# Patient Record
Sex: MALE | Race: WHITE | NOT HISPANIC OR LATINO | Employment: UNEMPLOYED | URBAN - METROPOLITAN AREA
[De-identification: names, ages, dates, MRNs, and addresses within clinical notes are randomized per-mention and may not be internally consistent; named-entity substitution may affect disease eponyms.]

---

## 2024-01-08 ENCOUNTER — HOSPITAL ENCOUNTER (INPATIENT)
Facility: HOSPITAL | Age: 44
LOS: 1 days | Discharge: LEFT BEFORE DISCHARGE | DRG: 770 | End: 2024-01-08
Attending: EMERGENCY MEDICINE | Admitting: FAMILY MEDICINE
Payer: COMMERCIAL

## 2024-01-08 ENCOUNTER — APPOINTMENT (EMERGENCY)
Dept: RADIOLOGY | Facility: HOSPITAL | Age: 44
DRG: 770 | End: 2024-01-08
Payer: COMMERCIAL

## 2024-01-08 VITALS
DIASTOLIC BLOOD PRESSURE: 76 MMHG | RESPIRATION RATE: 18 BRPM | TEMPERATURE: 97.6 F | HEART RATE: 78 BPM | OXYGEN SATURATION: 98 % | SYSTOLIC BLOOD PRESSURE: 133 MMHG

## 2024-01-08 DIAGNOSIS — D64.9 ANEMIA: ICD-10-CM

## 2024-01-08 DIAGNOSIS — K74.60 CIRRHOSIS (HCC): ICD-10-CM

## 2024-01-08 DIAGNOSIS — F10.10 ALCOHOL ABUSE: Primary | ICD-10-CM

## 2024-01-08 PROBLEM — R45.851 SUICIDAL IDEATION: Status: ACTIVE | Noted: 2024-01-08

## 2024-01-08 PROBLEM — F41.9 ANXIETY DISORDER: Status: ACTIVE | Noted: 2024-01-08

## 2024-01-08 PROBLEM — F10.939 ALCOHOL WITHDRAWAL (HCC): Status: ACTIVE | Noted: 2024-01-08

## 2024-01-08 PROBLEM — K70.31 ALCOHOLIC CIRRHOSIS OF LIVER WITH ASCITES (HCC): Status: ACTIVE | Noted: 2024-01-08

## 2024-01-08 PROBLEM — R17 ELEVATED BILIRUBIN: Status: ACTIVE | Noted: 2024-01-08

## 2024-01-08 PROBLEM — E89.0 POSTABLATIVE HYPOTHYROIDISM: Status: ACTIVE | Noted: 2024-01-08

## 2024-01-08 PROBLEM — R10.12 LEFT UPPER QUADRANT ABDOMINAL PAIN: Status: ACTIVE | Noted: 2024-01-08

## 2024-01-08 PROBLEM — Z72.0 TOBACCO ABUSE: Status: ACTIVE | Noted: 2024-01-08

## 2024-01-08 PROBLEM — D69.6 THROMBOCYTOPENIA (HCC): Status: ACTIVE | Noted: 2024-01-08

## 2024-01-08 LAB
2HR DELTA HS TROPONIN: 1 NG/L
ALBUMIN SERPL BCP-MCNC: 3 G/DL (ref 3.5–5)
ALP SERPL-CCNC: 344 U/L (ref 34–104)
ALT SERPL W P-5'-P-CCNC: 73 U/L (ref 7–52)
AMMONIA PLAS-SCNC: 68 UMOL/L (ref 18–72)
AMPHETAMINES SERPL QL SCN: NEGATIVE
ANION GAP SERPL CALCULATED.3IONS-SCNC: 6 MMOL/L
ANISOCYTOSIS BLD QL SMEAR: PRESENT
AST SERPL W P-5'-P-CCNC: 124 U/L (ref 13–39)
ATRIAL RATE: 187 BPM
ATRIAL RATE: 84 BPM
BARBITURATES UR QL: NEGATIVE
BASOPHILS # BLD AUTO: 0.07 THOUSANDS/ÂΜL (ref 0–0.1)
BASOPHILS NFR BLD AUTO: 2 % (ref 0–1)
BENZODIAZ UR QL: NEGATIVE
BILIRUB SERPL-MCNC: 2.48 MG/DL (ref 0.2–1)
BUN SERPL-MCNC: 12 MG/DL (ref 5–25)
CALCIUM ALBUM COR SERPL-MCNC: 8.7 MG/DL (ref 8.3–10.1)
CALCIUM SERPL-MCNC: 7.9 MG/DL (ref 8.4–10.2)
CARDIAC TROPONIN I PNL SERPL HS: 5 NG/L
CARDIAC TROPONIN I PNL SERPL HS: 6 NG/L
CHLORIDE SERPL-SCNC: 113 MMOL/L (ref 96–108)
CO2 SERPL-SCNC: 24 MMOL/L (ref 21–32)
COCAINE UR QL: NEGATIVE
CREAT SERPL-MCNC: 0.67 MG/DL (ref 0.6–1.3)
EOSINOPHIL # BLD AUTO: 0.34 THOUSAND/ÂΜL (ref 0–0.61)
EOSINOPHIL NFR BLD AUTO: 8 % (ref 0–6)
ERYTHROCYTE [DISTWIDTH] IN BLOOD BY AUTOMATED COUNT: 17.2 % (ref 11.6–15.1)
ETHANOL EXG-MCNC: 0.2 MG/DL
GFR SERPL CREATININE-BSD FRML MDRD: 119 ML/MIN/1.73SQ M
GLUCOSE SERPL-MCNC: 109 MG/DL (ref 65–140)
HCT VFR BLD AUTO: 29.9 % (ref 36.5–49.3)
HGB BLD-MCNC: 9.5 G/DL (ref 12–17)
IMM GRANULOCYTES # BLD AUTO: 0.01 THOUSAND/UL (ref 0–0.2)
IMM GRANULOCYTES NFR BLD AUTO: 0 % (ref 0–2)
LIPASE SERPL-CCNC: 143 U/L (ref 11–82)
LYMPHOCYTES # BLD AUTO: 1.81 THOUSANDS/ÂΜL (ref 0.6–4.47)
LYMPHOCYTES NFR BLD AUTO: 40 % (ref 14–44)
MCH RBC QN AUTO: 30.3 PG (ref 26.8–34.3)
MCHC RBC AUTO-ENTMCNC: 31.8 G/DL (ref 31.4–37.4)
MCV RBC AUTO: 95 FL (ref 82–98)
METHADONE UR QL: NEGATIVE
MONOCYTES # BLD AUTO: 0.49 THOUSAND/ÂΜL (ref 0.17–1.22)
MONOCYTES NFR BLD AUTO: 11 % (ref 4–12)
NEUTROPHILS # BLD AUTO: 1.83 THOUSANDS/ÂΜL (ref 1.85–7.62)
NEUTS SEG NFR BLD AUTO: 39 % (ref 43–75)
NRBC BLD AUTO-RTO: 0 /100 WBCS
OPIATES UR QL SCN: NEGATIVE
OXYCODONE+OXYMORPHONE UR QL SCN: POSITIVE
P AXIS: 42 DEGREES
PCP UR QL: NEGATIVE
PLATELET # BLD AUTO: 92 THOUSANDS/UL (ref 149–390)
PLATELET BLD QL SMEAR: ABNORMAL
PMV BLD AUTO: 10.7 FL (ref 8.9–12.7)
POTASSIUM SERPL-SCNC: 3.9 MMOL/L (ref 3.5–5.3)
PR INTERVAL: 108 MS
PROT SERPL-MCNC: 5.5 G/DL (ref 6.4–8.4)
QRS AXIS: 60 DEGREES
QRS AXIS: 83 DEGREES
QRSD INTERVAL: 84 MS
QRSD INTERVAL: 96 MS
QT INTERVAL: 392 MS
QT INTERVAL: 444 MS
QTC INTERVAL: 463 MS
QTC INTERVAL: 465 MS
RBC # BLD AUTO: 3.14 MILLION/UL (ref 3.88–5.62)
RBC MORPH BLD: PRESENT
SODIUM SERPL-SCNC: 143 MMOL/L (ref 135–147)
T WAVE AXIS: 20 DEGREES
T WAVE AXIS: 8 DEGREES
THC UR QL: NEGATIVE
VENTRICULAR RATE: 66 BPM
VENTRICULAR RATE: 84 BPM
WBC # BLD AUTO: 4.55 THOUSAND/UL (ref 4.31–10.16)

## 2024-01-08 PROCEDURE — 99239 HOSP IP/OBS DSCHRG MGMT >30: CPT | Performed by: FAMILY MEDICINE

## 2024-01-08 PROCEDURE — G1004 CDSM NDSC: HCPCS

## 2024-01-08 PROCEDURE — 74177 CT ABD & PELVIS W/CONTRAST: CPT

## 2024-01-08 PROCEDURE — 80053 COMPREHEN METABOLIC PANEL: CPT

## 2024-01-08 PROCEDURE — 93005 ELECTROCARDIOGRAM TRACING: CPT

## 2024-01-08 PROCEDURE — 99223 1ST HOSP IP/OBS HIGH 75: CPT | Performed by: FAMILY MEDICINE

## 2024-01-08 PROCEDURE — 99284 EMERGENCY DEPT VISIT MOD MDM: CPT

## 2024-01-08 PROCEDURE — 85025 COMPLETE CBC W/AUTO DIFF WBC: CPT

## 2024-01-08 PROCEDURE — 84484 ASSAY OF TROPONIN QUANT: CPT

## 2024-01-08 PROCEDURE — 83690 ASSAY OF LIPASE: CPT

## 2024-01-08 PROCEDURE — 71045 X-RAY EXAM CHEST 1 VIEW: CPT

## 2024-01-08 PROCEDURE — 82075 ASSAY OF BREATH ETHANOL: CPT

## 2024-01-08 PROCEDURE — 96374 THER/PROPH/DIAG INJ IV PUSH: CPT

## 2024-01-08 PROCEDURE — 80307 DRUG TEST PRSMV CHEM ANLYZR: CPT

## 2024-01-08 PROCEDURE — 82140 ASSAY OF AMMONIA: CPT

## 2024-01-08 PROCEDURE — 36415 COLL VENOUS BLD VENIPUNCTURE: CPT

## 2024-01-08 PROCEDURE — 99285 EMERGENCY DEPT VISIT HI MDM: CPT | Performed by: EMERGENCY MEDICINE

## 2024-01-08 RX ORDER — CARVEDILOL 3.12 MG/1
3.12 TABLET ORAL 2 TIMES DAILY WITH MEALS
Status: DISCONTINUED | OUTPATIENT
Start: 2024-01-08 | End: 2024-01-09 | Stop reason: HOSPADM

## 2024-01-08 RX ORDER — LANOLIN ALCOHOL/MO/W.PET/CERES
100 CREAM (GRAM) TOPICAL DAILY
Status: DISCONTINUED | OUTPATIENT
Start: 2024-01-09 | End: 2024-01-09 | Stop reason: HOSPADM

## 2024-01-08 RX ORDER — LEVOTHYROXINE SODIUM 112 UG/1
112 TABLET ORAL DAILY
Status: DISCONTINUED | OUTPATIENT
Start: 2024-01-09 | End: 2024-01-09 | Stop reason: HOSPADM

## 2024-01-08 RX ORDER — LACTULOSE 10 G/15ML
30 SOLUTION ORAL 2 TIMES DAILY
Status: DISCONTINUED | OUTPATIENT
Start: 2024-01-08 | End: 2024-01-09 | Stop reason: HOSPADM

## 2024-01-08 RX ORDER — SPIRONOLACTONE 100 MG/1
100 TABLET, FILM COATED ORAL DAILY
COMMUNITY

## 2024-01-08 RX ORDER — FOLIC ACID 1 MG/1
1 TABLET ORAL DAILY
Status: DISCONTINUED | OUTPATIENT
Start: 2024-01-09 | End: 2024-01-09 | Stop reason: HOSPADM

## 2024-01-08 RX ORDER — FUROSEMIDE 40 MG/1
40 TABLET ORAL DAILY
Status: DISCONTINUED | OUTPATIENT
Start: 2024-01-09 | End: 2024-01-09 | Stop reason: HOSPADM

## 2024-01-08 RX ORDER — ACETAMINOPHEN 325 MG/1
650 TABLET ORAL EVERY 6 HOURS PRN
Status: DISCONTINUED | OUTPATIENT
Start: 2024-01-08 | End: 2024-01-09 | Stop reason: HOSPADM

## 2024-01-08 RX ORDER — PANTOPRAZOLE SODIUM 40 MG/1
40 TABLET, DELAYED RELEASE ORAL DAILY
COMMUNITY

## 2024-01-08 RX ORDER — MAGNESIUM HYDROXIDE/ALUMINUM HYDROXICE/SIMETHICONE 120; 1200; 1200 MG/30ML; MG/30ML; MG/30ML
30 SUSPENSION ORAL EVERY 6 HOURS PRN
Status: DISCONTINUED | OUTPATIENT
Start: 2024-01-08 | End: 2024-01-09 | Stop reason: HOSPADM

## 2024-01-08 RX ORDER — PANTOPRAZOLE SODIUM 40 MG/1
40 TABLET, DELAYED RELEASE ORAL
Status: DISCONTINUED | OUTPATIENT
Start: 2024-01-09 | End: 2024-01-09 | Stop reason: HOSPADM

## 2024-01-08 RX ORDER — LANOLIN ALCOHOL/MO/W.PET/CERES
100 CREAM (GRAM) TOPICAL DAILY
Start: 2024-01-09

## 2024-01-08 RX ORDER — ENOXAPARIN SODIUM 100 MG/ML
40 INJECTION SUBCUTANEOUS DAILY
Status: DISCONTINUED | OUTPATIENT
Start: 2024-01-09 | End: 2024-01-08

## 2024-01-08 RX ORDER — CIPROFLOXACIN 500 MG/1
500 TABLET, FILM COATED ORAL EVERY 12 HOURS SCHEDULED
COMMUNITY

## 2024-01-08 RX ORDER — DIAZEPAM 5 MG/1
5 TABLET ORAL ONCE
Status: DISCONTINUED | OUTPATIENT
Start: 2024-01-08 | End: 2024-01-09 | Stop reason: HOSPADM

## 2024-01-08 RX ORDER — LACTULOSE 20 G/30ML
30 SOLUTION ORAL 2 TIMES DAILY
COMMUNITY

## 2024-01-08 RX ORDER — MAGNESIUM HYDROXIDE/ALUMINUM HYDROXICE/SIMETHICONE 120; 1200; 1200 MG/30ML; MG/30ML; MG/30ML
30 SUSPENSION ORAL ONCE
Status: COMPLETED | OUTPATIENT
Start: 2024-01-08 | End: 2024-01-08

## 2024-01-08 RX ORDER — BUPROPION HYDROCHLORIDE 150 MG/1
150 TABLET ORAL DAILY
COMMUNITY

## 2024-01-08 RX ORDER — FUROSEMIDE 40 MG/1
40 TABLET ORAL 2 TIMES DAILY
COMMUNITY
End: 2024-01-08 | Stop reason: CLARIF

## 2024-01-08 RX ORDER — ONDANSETRON 2 MG/ML
4 INJECTION INTRAMUSCULAR; INTRAVENOUS EVERY 6 HOURS PRN
Status: DISCONTINUED | OUTPATIENT
Start: 2024-01-08 | End: 2024-01-09 | Stop reason: HOSPADM

## 2024-01-08 RX ORDER — SPIRONOLACTONE 50 MG/1
100 TABLET, FILM COATED ORAL DAILY
Status: DISCONTINUED | OUTPATIENT
Start: 2024-01-09 | End: 2024-01-09 | Stop reason: HOSPADM

## 2024-01-08 RX ORDER — FUROSEMIDE 40 MG/1
40 TABLET ORAL DAILY
COMMUNITY

## 2024-01-08 RX ORDER — DIAZEPAM 5 MG/1
10 TABLET ORAL ONCE
Status: COMPLETED | OUTPATIENT
Start: 2024-01-08 | End: 2024-01-08

## 2024-01-08 RX ORDER — CIPROFLOXACIN 500 MG/1
500 TABLET, FILM COATED ORAL EVERY 12 HOURS SCHEDULED
Status: DISCONTINUED | OUTPATIENT
Start: 2024-01-08 | End: 2024-01-09 | Stop reason: HOSPADM

## 2024-01-08 RX ORDER — CARVEDILOL 3.12 MG/1
3.12 TABLET ORAL 2 TIMES DAILY WITH MEALS
COMMUNITY

## 2024-01-08 RX ORDER — FOLIC ACID 1 MG/1
1 TABLET ORAL DAILY
Start: 2024-01-09

## 2024-01-08 RX ORDER — LORAZEPAM 2 MG/ML
1 INJECTION INTRAMUSCULAR ONCE
Status: COMPLETED | OUTPATIENT
Start: 2024-01-08 | End: 2024-01-08

## 2024-01-08 RX ORDER — LEVOTHYROXINE SODIUM 112 UG/1
112 TABLET ORAL DAILY
COMMUNITY

## 2024-01-08 RX ORDER — NICOTINE 21 MG/24HR
21 PATCH, TRANSDERMAL 24 HOURS TRANSDERMAL ONCE
Status: DISCONTINUED | OUTPATIENT
Start: 2024-01-08 | End: 2024-01-09 | Stop reason: HOSPADM

## 2024-01-08 RX ORDER — BUPROPION HYDROCHLORIDE 150 MG/1
150 TABLET ORAL DAILY
Status: DISCONTINUED | OUTPATIENT
Start: 2024-01-09 | End: 2024-01-09 | Stop reason: HOSPADM

## 2024-01-08 RX ADMIN — DIAZEPAM 10 MG: 5 TABLET ORAL at 13:39

## 2024-01-08 RX ADMIN — LACTULOSE 30 G: 20 SOLUTION ORAL at 18:52

## 2024-01-08 RX ADMIN — CARVEDILOL 3.12 MG: 3.12 TABLET, FILM COATED ORAL at 18:53

## 2024-01-08 RX ADMIN — ALUMINUM HYDROXIDE, MAGNESIUM HYDROXIDE, AND SIMETHICONE 30 ML: 200; 200; 20 SUSPENSION ORAL at 06:45

## 2024-01-08 RX ADMIN — IOHEXOL 100 ML: 350 INJECTION, SOLUTION INTRAVENOUS at 05:57

## 2024-01-08 RX ADMIN — LORAZEPAM 1 MG: 2 INJECTION INTRAMUSCULAR; INTRAVENOUS at 05:30

## 2024-01-08 RX ADMIN — NICOTINE 21 MG: 21 PATCH, EXTENDED RELEASE TRANSDERMAL at 13:39

## 2024-01-08 RX ADMIN — RIFAXIMIN 550 MG: 550 TABLET ORAL at 18:53

## 2024-01-08 NOTE — ASSESSMENT & PLAN NOTE
Patient is a 43-year-old male with PMH of alcohol abuse, alcoholic liver cirrhosis with ascites, anemia, anxiety, hypothyroidism, presented to ED with complaint of left-sided abdominal pain.  Denies any melena, hematochezia, hematemesis.  Patient reports of drinking a handle of tequila per day.  In ER patient noted to be under alcohol intoxication initially however currently undergoing withdrawal.  Patient also reported suicidal ideation on arrival therefore placed in secure holding area.  Initial plan was to transfer patient to [place] detox however patient is now declining.  Continue thiamine/folic acid/MVI.  [facility] protocol

## 2024-01-08 NOTE — ASSESSMENT & PLAN NOTE
Denies any hematemesis, melena.  Hemoglobin 9.5, decreased from 11 from last month.  Obtain FOBT, iron panel, B12, folate  Will hold pharmacological DVT prophylaxis

## 2024-01-08 NOTE — ASSESSMENT & PLAN NOTE
Upon arrival to ED, patient reported suicidal ideation and significant increased anxiety.  On my exam, denies any suicidal ideation.  However presented with increased anxiety.  Currently in secured holding area.  Psych consult

## 2024-01-08 NOTE — ED PROVIDER NOTES
History  Chief Complaint   Patient presents with    Alcohol Intoxication     Pt reports he is in liver failure and drank tonight, unsure of how much     43-year-old male patient with a history of cirrhosis, kidney stones presenting with abdominal pain onset yesterday.  Patient states that he has been sober for 6 months however started drinking alcohol again.  Patient was on the kidney transplant list however was taken off when he was found to be drinking again.  Patient states that he has been having a handle of tequila daily.  Patient states that she started having some abdominal pain chest pain and shortness of breath starting yesterday.  Denies previous symptoms of this.  Denies fevers, cough, cold-like symptoms, vomiting, diarrhea but admits to nausea.        Prior to Admission Medications   Prescriptions Last Dose Informant Patient Reported? Taking?   buPROPion (WELLBUTRIN XL) 150 mg 24 hr tablet   Yes Yes   Sig: Take 150 mg by mouth daily   carvedilol (COREG) 3.125 mg tablet   Yes Yes   Sig: Take 3.125 mg by mouth 2 (two) times a day with meals   ciprofloxacin (CIPRO) 500 mg tablet   Yes Yes   Sig: Take 500 mg by mouth every 12 (twelve) hours   furosemide (LASIX) 40 mg tablet   Yes Yes   Sig: Take 40 mg by mouth daily   lactulose 20 g/30 mL   Yes Yes   Sig: Take 30 g by mouth 2 (two) times a day   levothyroxine 112 mcg tablet   Yes Yes   Sig: Take 112 mcg by mouth daily   pantoprazole (PROTONIX) 40 mg tablet   Yes Yes   Sig: Take 40 mg by mouth daily   rifaximin (XIFAXAN) 550 mg tablet   Yes Yes   Sig: Take 550 mg by mouth every 8 (eight) hours   spironolactone (ALDACTONE) 100 mg tablet   Yes Yes   Sig: Take 100 mg by mouth daily      Facility-Administered Medications: None       Past Medical History:   Diagnosis Date    Liver failure (HCC)        History reviewed. No pertinent surgical history.    History reviewed. No pertinent family history.  I have reviewed and agree with the history as  documented.    E-Cigarette/Vaping     E-Cigarette/Vaping Substances     Social History     Tobacco Use    Smoking status: Every Day     Types: Cigarettes    Smokeless tobacco: Never   Substance Use Topics    Alcohol use: Yes    Drug use: Not Currently        Review of Systems   Respiratory:  Positive for shortness of breath.    Cardiovascular:  Positive for chest pain.   Gastrointestinal:  Positive for abdominal pain and nausea.   All other systems reviewed and are negative.      Physical Exam  ED Triage Vitals   Temperature Pulse Respirations Blood Pressure SpO2   01/08/24 0517 01/08/24 0401 01/08/24 0401 01/08/24 0401 01/08/24 0401   97.6 °F (36.4 °C) 90 20 136/72 98 %      Temp Source Heart Rate Source Patient Position - Orthostatic VS BP Location FiO2 (%)   01/08/24 0517 01/08/24 0401 01/08/24 0401 01/08/24 0401 --   Oral Monitor Lying Left arm       Pain Score       --                    Orthostatic Vital Signs  Vitals:    01/08/24 0401 01/08/24 0610 01/08/24 1857 01/08/24 1858   BP: 136/72 138/73 133/76 133/76   Pulse: 90 72 78 78   Patient Position - Orthostatic VS: Lying Lying         Physical Exam  Vitals reviewed.   Constitutional:       Appearance: Normal appearance.   HENT:      Head: Normocephalic and atraumatic.      Nose: Nose normal.      Mouth/Throat:      Mouth: Mucous membranes are moist.      Pharynx: Oropharynx is clear.   Eyes:      Extraocular Movements: Extraocular movements intact.      Conjunctiva/sclera: Conjunctivae normal.   Cardiovascular:      Rate and Rhythm: Normal rate and regular rhythm.      Pulses: Normal pulses.      Heart sounds: Normal heart sounds.   Pulmonary:      Effort: Pulmonary effort is normal.      Breath sounds: Normal breath sounds.   Abdominal:      General: Bowel sounds are normal. There is distension.      Palpations: Abdomen is soft.      Tenderness: There is abdominal tenderness (Generalized abdominal tenderness worse on left). There is left CVA tenderness.  There is no right CVA tenderness.   Musculoskeletal:         General: Normal range of motion.      Cervical back: Normal range of motion.   Skin:     General: Skin is warm and dry.      Coloration: Skin is jaundiced (Mildly jaundiced).   Neurological:      General: No focal deficit present.      Mental Status: He is alert and oriented to person, place, and time. Mental status is at baseline.         ED Medications  Medications   LORazepam (ATIVAN) injection 1 mg (1 mg Intravenous Given 1/8/24 3230)   iohexol (OMNIPAQUE) 350 MG/ML injection (MULTI-DOSE) 100 mL (100 mL Intravenous Given 1/8/24 9257)   aluminum-magnesium hydroxide-simethicone (MAALOX) oral suspension 30 mL (30 mL Oral Given 1/8/24 1845)   diazepam (VALIUM) tablet 10 mg (10 mg Oral Given 1/8/24 9239)       Diagnostic Studies  Results Reviewed       Procedure Component Value Units Date/Time    Rapid drug screen, urine [[id number]]  (Abnormal) Collected: 01/08/24 1333    Lab Status: Final result Specimen: Urine, Clean Catch Updated: 01/08/24 1404     Amph/Meth UR Negative     Barbiturate Ur Negative     Benzodiazepine Urine Negative     Cocaine Urine Negative     Methadone Urine Negative     Opiate Urine Negative     PCP Ur Negative     THC Urine Negative     Oxycodone Urine Positive    Narrative:      Presumptive report. If requested, specimen will be sent to reference lab for confirmation.  FOR MEDICAL PURPOSES ONLY.   IF CONFIRMATION NEEDED PLEASE CONTACT THE LAB WITHIN 5 DAYS.    Drug Screen Cutoff Levels:  AMPHETAMINE/METHAMPHETAMINES  1000 ng/mL  BARBITURATES     200 ng/mL  BENZODIAZEPINES     200 ng/mL  COCAINE      300 ng/mL  METHADONE      300 ng/mL  OPIATES      300 ng/mL  PHENCYCLIDINE     25 ng/mL  THC       50 ng/mL  OXYCODONE      100 ng/mL    POCT alcohol breath test [[id number]]  (Normal) Resulted: 01/08/24 1303    Lab Status: Final result Updated: 01/08/24 1303     EXTBreath Alcohol 0.200    HS Troponin I 2hr [[id number]]  (Normal)  Collected: 01/08/24 0616    Lab Status: Final result Specimen: Blood from Arm, Left Updated: 01/08/24 0651     hs TnI 2hr 6 ng/L      Delta 2hr hsTnI 1 ng/L     CBC and differential [[id number]]  (Abnormal) Collected: 01/08/24 0448    Lab Status: Final result Specimen: Blood from Arm, Left Updated: 01/08/24 0542     WBC 4.55 Thousand/uL      RBC 3.14 Million/uL      Hemoglobin 9.5 g/dL      Hematocrit 29.9 %      MCV 95 fL      MCH 30.3 pg      MCHC 31.8 g/dL      RDW 17.2 %      MPV 10.7 fL      Platelets 92 Thousands/uL      nRBC 0 /100 WBCs      Neutrophils Relative 39 %      Immat GRANS % 0 %      Lymphocytes Relative 40 %      Monocytes Relative 11 %      Eosinophils Relative 8 %      Basophils Relative 2 %      Neutrophils Absolute 1.83 Thousands/µL      Immature Grans Absolute 0.01 Thousand/uL      Lymphocytes Absolute 1.81 Thousands/µL      Monocytes Absolute 0.49 Thousand/µL      Eosinophils Absolute 0.34 Thousand/µL      Basophils Absolute 0.07 Thousands/µL     Narrative:      This is an appended report.  These results have been appended to a previously verified report.    Smear Review(Phlebs Do Not Order) [[id number]]  (Abnormal) Collected: 01/08/24 0448    Lab Status: Final result Specimen: Blood from Arm, Left Updated: 01/08/24 0542     RBC Morphology Present     Platelet Estimate Decreased     Anisocytosis Present    Ammonia [[id number]]  (Normal) Collected: 01/08/24 0448    Lab Status: Final result Specimen: Blood from Arm, Left Updated: 01/08/24 0526     Ammonia 68 umol/L     Comprehensive metabolic panel [[id number]]  (Abnormal) Collected: 01/08/24 0448    Lab Status: Final result Specimen: Blood from Arm, Left Updated: 01/08/24 0525     Sodium 143 mmol/L      Potassium 3.9 mmol/L      Chloride 113 mmol/L      CO2 24 mmol/L      ANION GAP 6 mmol/L      BUN 12 mg/dL      Creatinine 0.67 mg/dL      Glucose 109 mg/dL      Calcium 7.9 mg/dL      Corrected Calcium 8.7 mg/dL       U/L      ALT 73 U/L       Alkaline Phosphatase 344 U/L      Total Protein 5.5 g/dL      Albumin 3.0 g/dL      Total Bilirubin 2.48 mg/dL      eGFR 119 ml/min/1.73sq m     Narrative:      National Kid[employer] Foundation guidelines for Chronic Kidney Disease (CKD):     Stage 1 with normal or high GFR (GFR > 90 mL/min/1.73 square meters)    Stage 2 Mild CKD (GFR = 60-89 mL/min/1.73 square meters)    Stage 3A Moderate CKD (GFR = 45-59 mL/min/1.73 square meters)    Stage 3B Moderate CKD (GFR = 30-44 mL/min/1.73 square meters)    Stage 4 Severe CKD (GFR = 15-29 mL/min/1.73 square meters)    Stage 5 End Stage CKD (GFR <15 mL/min/1.73 square meters)  Note: GFR calculation is accurate only with a steady state creatinine    Lipase [[id number]]  (Abnormal) Collected: 01/08/24 0448    Lab Status: Final result Specimen: Blood from Arm, Left Updated: 01/08/24 0525     Lipase 143 u/L     HS Troponin 0hr (reflex protocol) [[id number]]  (Normal) Collected: 01/08/24 0448    Lab Status: Final result Specimen: Blood from Arm, Left Updated: 01/08/24 0525     hs TnI 0hr 5 ng/L                    CT abdomen pelvis with contrast   Final Result by Doug Tavares MD (01/08 0635)      No evidence of acute pathology.      Cirrhotic liver and portal hypertension with splenomegaly and small ascites.      Nonobstructing left renal calculi.            Workstation performed: [id number]         XR chest portable   ED Interpretation by Jose Rafael Quinn MD (01/08 0623)   No acute cardiopulm disease      Final Result by Chen Nick MD (01/08 0924)      No acute cardiopulmonary disease.               Workstation performed: [id number]               Procedures  Procedures      ED Course             HEART Risk Score      Flowsheet Row Most Recent Value   Heart Score Risk Calculator    History 0 Filed at: 01/08/2024 0654   ECG 1 Filed at: 01/08/2024 0654   Age 0 Filed at: 01/08/2024 0654   Risk Factors 0 Filed at: 01/08/2024 0654   Troponin 0 Filed at: 01/08/2024  0654   HEART Score 1 Filed at: 01/08/2024 0654                        SBIRT 20yo+      Flowsheet Row Most Recent Value   Initial Alcohol Screen: US AUDIT-C     1. How often do you have a drink containing alcohol? 3 Filed at: 01/08/2024 0403   Audit-C Score 3 Filed at: 01/08/2024 0403                  Medical Decision Making  43-year-old male patient presenting with abdominal pain.  Patient has a history of cirrhosis and alcohol abuse.  Patient has been drinking for the past couple days.  Patient also has chest pain and shortness of breath.  DDx includes cirrhosis, SBP, intra-abdominal process, gastritis, ACS.  Labs show some anemia, elevated LFTs with elevated T. bili.  CT abdomen pelvis pending.    Ct abdomen pelvis c/w cirrohsis and small ascites. Labs otherwise WNL. Abd improved spontaneoulsy. Patient requesting rehab. Host referral made. Signed out to Dr. Mike pending CT.     Amount and/or Complexity of Data Reviewed  Labs: ordered.  Radiology: ordered and independent interpretation performed.    Risk  OTC drugs.  Prescription drug management.          Disposition  Final diagnoses:   Alcohol abuse   Cirrhosis (HCC)   Anemia     Time reflects when diagnosis was documented in both MDM as applicable and the Disposition within this note       Time User Action Codes Description Comment    1/8/2024  7:11 AM Jose Rafael Quinn Add [F10.10] Alcohol abuse     1/8/2024  7:11 AM Jose Rafael Quinn Add [K74.60] Cirrhosis (HCC)     1/8/2024  4:28 PM Berenice Mike Add [D64.9] Anemia           ED Disposition       ED Disposition   AMA    Condition   --    Date/Time   Mon Jan 8, 2024 2132    Comment   Case was discussed with BERNIE and the patient's admission status was agreed to be Admission Status: inpatient status to the service of Dr. KEVYN Nicole.               Follow-up Information    None         Discharge Medication List as of 1/8/2024 10:46 PM        START taking these medications    Details   folic acid (FOLVITE) 1 mg tablet  Take 1 tablet (1 mg total) by mouth daily Do not start before January 9, 2024., Starting Tue 1/9/2024, No Print      thiamine 100 MG tablet Take 1 tablet (100 mg total) by mouth daily Do not start before January 9, 2024., Starting Tue 1/9/2024, No Print           CONTINUE these medications which have NOT CHANGED    Details   buPROPion (WELLBUTRIN XL) 150 mg 24 hr tablet Take 150 mg by mouth daily, Historical Med      carvedilol (COREG) 3.125 mg tablet Take 3.125 mg by mouth 2 (two) times a day with meals, Historical Med      ciprofloxacin (CIPRO) 500 mg tablet Take 500 mg by mouth every 12 (twelve) hours, Historical Med      furosemide (LASIX) 40 mg tablet Take 40 mg by mouth daily, Historical Med      lactulose 20 g/30 mL Take 30 g by mouth 2 (two) times a day, Historical Med      levothyroxine 112 mcg tablet Take 112 mcg by mouth daily, Historical Med      pantoprazole (PROTONIX) 40 mg tablet Take 40 mg by mouth daily, Historical Med      rifaximin (XIFAXAN) 550 mg tablet Take 550 mg by mouth every 8 (eight) hours, Historical Med      spironolactone (ALDACTONE) 100 mg tablet Take 100 mg by mouth daily, Historical Med           Outpatient Discharge Orders   Discharge Diet     Activity as tolerated       PDMP Review       None             ED Provider  Attending physically available and evaluated Willy Evans. I managed the patient along with the ED Attending.    Electronically Signed by           Jose Rafael Quinn MD  01/10/24 7589

## 2024-01-08 NOTE — ASSESSMENT & PLAN NOTE
Patient is a 43-year-old male with PMH of alcohol abuse, alcoholic liver cirrhosis with ascites, anemia, anxiety, hypothyroidism, presented to ED with complaint of left-sided abdominal pain.  Denies any melena, hematochezia, hematemesis.  Patient found to be alcohol intoxication  Labs reveals mildly elevated lipase.  CT abdomen and pelvis is negative for acute pancreatitis, consistent with the liver cirrhosis with small ascites.

## 2024-01-08 NOTE — ASSESSMENT & PLAN NOTE
Grave's Disease s/p radioiodine therapy (2015) with postablative hypothyroidism,  Continue home levothyroxine

## 2024-01-08 NOTE — ED CARE HANDOFF
[facility] Warm Handoff Outcome Note    Patient name Willy Evans  Location [facility] MRN [id number]  Age: 43 y.o.          Plan Type:  Warm Handoff                                                                                    Plan Date: 1/8/2024  Service:  ED Warm Handoff      Substance Use History:  Alcohol    Warm Handoff Update:  Contacted HOST w/ patient information    Warm Handoff Outcome: Non-Treatment Related Resources

## 2024-01-08 NOTE — H&P
[facility]  H&P  Name: Willy Evans 43 y.o. male I MRN: [id number]  Unit/Bed#: SH 02 I Date of Admission: 1/8/2024   Date of Service: 1/8/2024 I Hospital Day: 0      Assessment/Plan   Left upper quadrant abdominal pain  Assessment & Plan  Patient is a 43-year-old male with PMH of alcohol abuse, alcoholic liver cirrhosis with ascites, anemia, anxiety, hypothyroidism, presented to ED with complaint of left-sided abdominal pain.  Denies any melena, hematochezia, hematemesis.  Patient found to be alcohol intoxication  Labs reveals mildly elevated lipase.  CT abdomen and pelvis is negative for acute pancreatitis, consistent with the liver cirrhosis with small ascites.    * Alcohol withdrawal (HCC)  Assessment & Plan  Patient is a 43-year-old male with PMH of alcohol abuse, alcoholic liver cirrhosis with ascites, anemia, anxiety, hypothyroidism, presented to ED with complaint of left-sided abdominal pain.  Denies any melena, hematochezia, hematemesis.  Patient reports of drinking a handle of tequila per day.  In ER patient noted to be under alcohol intoxication initially however currently undergoing withdrawal.  Patient also reported suicidal ideation on arrival therefore placed in secure holding area.  Initial plan was to transfer patient to [place] detox however patient is now declining.  Continue thiamine/folic acid/MVI.  [facility] protocol    Anxiety disorder  Assessment & Plan  Continue home Wellbutrin    Thrombocytopenia (HCC)  Assessment & Plan  Secondary to liver cirrhosis  Hold pharmacological DVT prophylaxis    Postablative hypothyroidism  Assessment & Plan  Grave's Disease s/p radioiodine therapy (2015) with postablative hypothyroidism,  Continue home levothyroxine    Suicidal ideation  Assessment & Plan  Upon arrival to ED, patient reported suicidal ideation and significant increased anxiety.  On my exam, denies any suicidal ideation.  However presented with increased  anxiety.  Currently in secured holding area.  Psych consult    Tobacco abuse  Assessment & Plan  Nicotine replacement patch    Alcoholic cirrhosis of liver with ascites (HCC)  Assessment & Plan  Patient with history of alcoholic liver cirrhosis, currently actively drinking.  Previously on transplant list however taken off due to continuous drinking.  CT scan reveals small ascites.  Patient reported missing multiple doses of his diuretics.  Resume home diuretics Lasix and spironolactone.  Patient also on carvedilol, lactulose, which she has been compliant with.  Patient also on Cipro and rifaximin for SBP prevention, will resume.    Elevated bilirubin  Assessment & Plan  Total bilirubin 2+, appearing uptrending from recent labs at outside facility.  CT abd shows: No calcified gallstones. No pericholecystic inflammatory change     Anemia  Assessment & Plan  Denies any hematemesis, melena.  Hemoglobin 9.5, decreased from 11 from last month.  Obtain FOBT, iron panel, B12, folate  Will hold pharmacological DVT prophylaxis           VTE Prophylaxis: Pharmacologic VTE Prophylaxis contraindicated due to thrombocytopenia   / sequential compression device   Code Status: Full code    Anticipated Length of Stay:  Patient will be admitted on an Inpatient basis with an anticipated length of stay of  > 2 midnights.   Justification for Hospital Stay: Alcohol intoxication, liver cirrhosis with ascites, abdominal pain, suicidal ideation    Total Time for Visit, including Counseling / Coordination of Care: 60 minutes.  Greater than 50% of this total time spent on direct patient counseling and coordination of care.    Chief Complaint:   Abdominal pain    History of Present Illness:    Willy Evans is a 43 y.o. male PMHx of Cirrhosis secondary to alcohol use, Grave's Disease s/p radioiodine therapy (2015) with postablative hypothyroidism, HTN, HLD, nephrolithiasis, ADHD and tobacco abuse presented to ED with complaint of left abdominal  pain.  Initially elevated lipase noted with elevated blood alcohol level.  CT abdomen pelvis is negative for acute pancreatitis.  In ER, subsequently patient undergoing withdrawal symptoms including increased anxiety, tremors, nausea.  Patient was suggested transfer to detox unit at [place] however currently he is refusing.  On my exam patient is alert, awake, cooperative.  Denies any suicidal ideation on my exam however has significant increased anxiety.    Review of Systems:    Review of Systems    Past Medical and Surgical History:     Past Medical History:   Diagnosis Date    Liver failure (HCC)        History reviewed. No pertinent surgical history.    Meds/Allergies:    Prior to Admission medications    Not on File     I have reviewed home medications with patient personally.    Allergies: No Known Allergies    Social History:     Marital Status: Single      Substance Use History:   Social History     Substance and Sexual Activity   Alcohol Use Yes     Social History     Tobacco Use   Smoking Status Every Day    Types: Cigarettes   Smokeless Tobacco Never     Social History     Substance and Sexual Activity   Drug Use Not Currently       Family History:    History reviewed. No pertinent family history.    Physical Exam:     Vitals:   Blood Pressure: 138/73 (01/08/24 0610)  Pulse: 72 (01/08/24 0610)  Temperature: 97.6 °F (36.4 °C) (01/08/24 0517)  Temp Source: Oral (01/08/24 0517)  Respirations: 18 (01/08/24 0610)  SpO2: 98 % (01/08/24 0610)    Physical Exam  Vitals and nursing note reviewed.   Constitutional:       General: He is not in acute distress.     Appearance: He is well-developed.   HENT:      Head: Normocephalic and atraumatic.   Eyes:      Conjunctiva/sclera: Conjunctivae normal.   Cardiovascular:      Rate and Rhythm: Normal rate and regular rhythm.      Heart sounds: No murmur heard.  Pulmonary:      Effort: Pulmonary effort is normal. No respiratory distress.      Breath sounds: Normal  breath sounds.   Abdominal:      General: There is distension.      Palpations: Abdomen is soft.      Tenderness: There is abdominal tenderness.   Musculoskeletal:         General: No swelling.      Cervical back: Neck supple.   Skin:     General: Skin is warm and dry.      Capillary Refill: Capillary refill takes less than 2 seconds.   Neurological:      Mental Status: He is alert and oriented to person, place, and time.      Comments: Bilateral upper extremity fine tremors   Psychiatric:         Mood and Affect: Mood is anxious.         Thought Content: Thought content does not include suicidal ideation.            Additional Data:     Lab Results: I have personally reviewed pertinent reports.      Results from last 7 days   Lab Units 01/08/24  0448   WBC Thousand/uL 4.55   HEMOGLOBIN g/dL 9.5*   HEMATOCRIT % 29.9*   PLATELETS Thousands/uL 92*   NEUTROS PCT % 39*   LYMPHS PCT % 40   MONOS PCT % 11   EOS PCT % 8*     Results from last 7 days   Lab Units 01/08/24  0448   SODIUM mmol/L 143   POTASSIUM mmol/L 3.9   CHLORIDE mmol/L 113*   CO2 mmol/L 24   BUN mg/dL 12   CREATININE mg/dL 0.67   ANION GAP mmol/L 6   CALCIUM mg/dL 7.9*   ALBUMIN g/dL 3.0*   TOTAL BILIRUBIN mg/dL 2.48*   ALK PHOS U/L 344*   ALT U/L 73*   AST U/L 124*   GLUCOSE RANDOM mg/dL 109                       Imaging: I have personally reviewed pertinent reports.      CT abdomen pelvis with contrast   Final Result by Doug Tavares MD (01/08 0635)      No evidence of acute pathology.      Cirrhotic liver and portal hypertension with splenomegaly and small ascites.      Nonobstructing left renal calculi.            Workstation performed: [id number]         XR chest portable   ED Interpretation by Jose Rafael Quinn MD (01/08 0623)   No acute cardiopulm disease      Final Result by Chen Nick MD (01/08 0924)      No acute cardiopulmonary disease.               Workstation performed: [id number]             EKG, Pathology, and Other Studies  Reviewed on Admission:   EKG: siinus rhtym    Allscripts / Epic Records Reviewed: Yes     ** Please Note: This note has been constructed using a voice recognition system. **

## 2024-01-08 NOTE — ASSESSMENT & PLAN NOTE
Total bilirubin 2+, appearing uptrending from recent labs at outside facility.  Will obtain right upper quadrant ultrasound

## 2024-01-08 NOTE — Clinical Note
Case was discussed with BERNIE and the patient's admission status was agreed to be Admission Status: inpatient status to the service of Dr. KEVYN Nicole.

## 2024-01-08 NOTE — ED ATTENDING ATTESTATION
1/8/2024  I, Jacob Miranda MD, saw and evaluated the patient. I have discussed the patient with the resident/non-physician practitioner and agree with the resident's/non-physician practitioner's findings, Plan of Care, and MDM as documented in the resident's/non-physician practitioner's note, except where noted. All available labs and Radiology studies were reviewed.  I was present for key portions of any procedure(s) performed by the resident/non-physician practitioner and I was immediately available to provide assistance.       At this point I agree with the current assessment done in the Emergency Department.  I have conducted an independent evaluation of this patient a history and physical is as follows:    43-year-old male with a history of alcoholic cirrhosis of liver with ascites presents to the emergency department for evaluation of acute alcohol intoxication.  Patient was on the transplant list however recently relapsed on alcohol.  Endorses drinking unknown amount of alcohol today.  He denies any trauma, falls, or known injuries.  No fevers or chills.  No vomiting.  Does have some nausea.    On exam, patient was comfortably in bed in no acute distress, head is normocephalic atraumatic, pupils equal round reactive to light, neck is supple without meningismus signs, heart is regular rate and rhythm with intact distal pulses, no increased work of breathing, respiratory distress, or stridor.  Abdomen is distended, but soft with mild diffuse abdominal pain.    Differential diagnosis includes but is not limited to cirrhosis, pancreatitis, biliary disease, anemia, ACS, pneumonia, pneumothorax.  Plan to check labs and CT scan.  Will dispo accordingly.    Labs demonstrate elevated liver enzymes consistent with known alcoholic cirrhosis, remaining labs unremarkable.  Troponin including delta troponin negative.  Heart score is low.  Patient will be discharged home.             ED Course  ED Course as of 01/08/24  0654   Mon Jan 08, 2024   0624 No acute cardiopulmonary abnormality per my interpretation chest x-ray         Critical Care Time  Procedures

## 2024-01-08 NOTE — ASSESSMENT & PLAN NOTE
Patient with history of alcoholic liver cirrhosis, currently actively drinking.  Previously on transplant list however taken off due to continuous drinking.  CT scan reveals small ascites.  Patient reported missing multiple doses of his diuretics.  Resume home diuretics Lasix and spironolactone.  Patient also on carvedilol, lactulose, which she has been compliant with.  Patient also on Cipro and rifaximin for SBP prevention, will resume.

## 2024-01-08 NOTE — ED CARE HANDOFF
Emergency Department Sign Out Note        Sign out and transfer of care from Dr. ERIC Quinn. See Separate Emergency Department note.     The patient, Willy Evans, was evaluated by the previous provider for EtOH abuse, request for inpatient/detox.    Workup Completed:  Labs    ED Course / Workup Pending (followup):  HOST      HEART Risk Score      Flowsheet Row Most Recent Value   Heart Score Risk Calculator    History 0 Filed at: 01/08/2024 0654   ECG 1 Filed at: 01/08/2024 0654   Age 0 Filed at: 01/08/2024 0654   Risk Factors 0 Filed at: 01/08/2024 0654   Troponin 0 Filed at: 01/08/2024 0654   HEART Score 1 Filed at: 01/08/2024 0654                                    ED Course as of 01/08/24 1630   Mon Jan 08, 2024   0703 SO: hx of cirrhosis, began drinking again, generalized abd/chest pain, asking for rehab but wants to speak with his girlfriend, HOST referral in, able to dc after HOST   1017 Host attempted to talk with patient x4, patient continues to fall asleep and stop responding mid conversation.   1151 Girlfriend at bedside, assisting patient with talking to HOST   1326 Transferred to secure holding due to emotional instability and reporting SI to HOST on the phone. States he has +SI, no plan/HI/AH/VH. Valium ordered.     Procedures  Medical Decision Making  Amount and/or Complexity of Data Reviewed  Labs: ordered.  Radiology: ordered and independent interpretation performed.    Risk  OTC drugs.  Prescription drug management.      Case discussed with  Detox Unit, patient declines transfer to this facility due to isolation protocol and phenobarbital use.     Patient refusing rectal exam, unable to rule out GI bleed/variceal bleed at this time.    Discussed results and plan with patient. Advised on need for admission at this time.  All questions answered. Patient expressed verbal understanding and is agreeable with plan for admission. Case discussed with BERNIE who will admit patient for EtOH  withdrawal.    Disposition  Final diagnoses:   Alcohol abuse   Cirrhosis (HCC)   Anemia     Time reflects when diagnosis was documented in both MDM as applicable and the Disposition within this note       Time User Action Codes Description Comment    1/8/2024  7:11 AM Jose Rafael Quinn [F10.10] Alcohol abuse     1/8/2024  7:11 AM Jose Rafael Quinn [K74.60] Cirrhosis (HCC)     1/8/2024  4:28 PM Berenice Mike [D64.9] Anemia           ED Disposition       ED Disposition   Admit    Condition   Stable    Date/Time   Mon Jan 8, 2024 1623    Comment   Case was discussed with BERNIE and the patient's admission status was agreed to be Admission Status: inpatient status to the service of Dr. KEVYN Nicole.               Follow-up Information    None       Patient's Medications    No medications on file     No discharge procedures on file.       ED Provider  Electronically Signed by     Berenice Mike DO  01/08/24 8100

## 2024-01-08 NOTE — ED NOTES
"Pt began vomiting into trash can, physician taking care of patient began helping patient. Patient became agitated and started to yell at the physician and RN about being in pain and wanting to leave. Patient was offered medication for pain and patient refused stating \"I just want to go home or be admitted.\" Patient was told that he is unable to leave right now due to his current condition. Patient became angry and hostile with staff proceeding to slip off his shoes and take off his glasses and telling staff to \"try to stop me\". Patient was able to be redirected and became calm with the help of more staff.     Jyotsna Sanchez RN  01/08/24 0042    "

## 2024-01-08 NOTE — ED NOTES
RN asked patient if he could provide a urine sample, patient states that he was just wheel chaired out by his girlfriend to pee outside. RN explained that is not appropriate in this setting. Patient verbalized understanding and states that he cannot provide a urine specimen at this time.     Jyotsna Sanchez RN  01/08/24 1105

## 2024-01-08 NOTE — ASSESSMENT & PLAN NOTE
Total bilirubin 2+, appearing uptrending from recent labs at outside facility.  CT abd shows: No calcified gallstones. No pericholecystic inflammatory change

## 2024-01-08 NOTE — DISCHARGE INSTRUCTIONS
You were seen in the ED for alcohol abuse. Return to the ED for any worsening symptoms or new symptoms. Follow up with your primary care doctor and rehab/HOST as soon as possible.

## 2024-01-09 NOTE — UTILIZATION REVIEW
Initial Clinical Review    Admission: Date/Time/Statement:   Admission Orders (From admission, onward)       Ordered        01/08/24 1629  INPATIENT ADMISSION  Once                          Orders Placed This Encounter   Procedures    INPATIENT ADMISSION     Standing Status:   Standing     Number of Occurrences:   1     Order Specific Question:   Level of Care     Answer:   Med Surg [16]     Order Specific Question:   Estimated length of stay     Answer:   More than 2 Midnights     Order Specific Question:   Certification     Answer:   I certify that inpatient services are medically necessary for this patient for a duration of greater than two midnights. See H&P and MD Progress Notes for additional information about the patient's course of treatment.     ED Arrival Information       Expected   -    Arrival   1/8/2024 03:55    Acuity   Urgent              Means of arrival   Walk-In    Escorted by   Family Member    Service   Hospitalist    Admission type   Emergency              Arrival complaint   Medical Problem             Chief Complaint   Patient presents with    Alcohol Intoxication     Pt reports he is in liver failure and drank tonight, unsure of how much       Initial Presentation: 43 y.o. male with PMHx: Cirrhosis secondary to alcohol use, Grave's Disease s/p radioiodine therapy (2015) with postablative hypothyroidism, HTN, HLD, nephrolithiasis, ADHD and tobacco abuse, who presented to [facility] ED due to left abdominal pain. On exam, pt jaundice, abd distention and tenderness noted. Labs noted elevated lipase and elevated blood alcohol level of 0.200, urine positive for oxycodone.  CT AP is negative for acute pancreatitis.  In ER, subsequently patient undergoing withdrawal symptoms including increased anxiety, tremors, nausea.  Patient was suggested transfer to detox unit at [place] however currently he is refusing.  Plan:  Admit Inpatient status to med surg dt ETOH WD, LUQ Abd pain: start  CIWA, give thiamine, folic acid and MVI. Order psych consult, continue home meds.     1/8 Pt left AMA.      ED Triage Vitals   Temperature Pulse Respirations Blood Pressure SpO2   01/08/24 0517 01/08/24 0401 01/08/24 0401 01/08/24 0401 01/08/24 0401   97.6 °F (36.4 °C) 90 20 136/72 98 %      Temp Source Heart Rate Source Patient Position - Orthostatic VS BP Location FiO2 (%)   01/08/24 0517 01/08/24 0401 01/08/24 0401 01/08/24 0401 --   Oral Monitor Lying Left arm       Pain Score       --                 Wt Readings from Last 1 Encounters:   No data found for Wt     Additional Vital Signs:   Date/Time Temp Pulse Resp BP SpO2 O2 Device Patient Position - Orthostatic VS   01/08/24 1858 -- 78 -- 133/76 -- -- --   01/08/24 1857 -- 78 18 133/76 -- -- --   01/08/24 0610 -- 72 18 138/73 98 % None (Room air) Lying   01/08/24 0517 97.6 °F (36.4 °C) -- -- -- -- -- --   01/08/24 0450 -- -- -- -- -- None (Room air) --   01/08/24 0401 -- 90 20 136/72 98 % None (Room air) Lying     Pertinent Labs/Diagnostic Test Results:   1/8 EKG: Sinus rhythm with short UT     CT abdomen pelvis with contrast   Final Result by Doug Tavares MD (01/08 0635)      No evidence of acute pathology.      Cirrhotic liver and portal hypertension with splenomegaly and small ascites.      Nonobstructing left renal calculi.            Workstation performed: [id number]         XR chest portable   ED Interpretation by Jose Rafael Quinn MD (01/08 0623)   No acute cardiopulm disease      Final Result by Chen Nick MD (01/08 0924)      No acute cardiopulmonary disease.               Workstation performed: [id number]               Results from last 7 days   Lab Units 01/08/24  0448   WBC Thousand/uL 4.55   HEMOGLOBIN g/dL 9.5*   HEMATOCRIT % 29.9*   PLATELETS Thousands/uL 92*   NEUTROS ABS Thousands/µL 1.83*         Results from last 7 days   Lab Units 01/08/24  0448   SODIUM mmol/L 143   POTASSIUM mmol/L 3.9   CHLORIDE mmol/L 113*   CO2 mmol/L 24    ANION GAP mmol/L 6   BUN mg/dL 12   CREATININE mg/dL 0.67   EGFR ml/min/1.73sq m 119   CALCIUM mg/dL 7.9*     Results from last 7 days   Lab Units 01/08/24  0448   AST U/L 124*   ALT U/L 73*   ALK PHOS U/L 344*   TOTAL PROTEIN g/dL 5.5*   ALBUMIN g/dL 3.0*   TOTAL BILIRUBIN mg/dL 2.48*   AMMONIA umol/L 68         Results from last 7 days   Lab Units 01/08/24  0448   GLUCOSE RANDOM mg/dL 109     Results from last 7 days   Lab Units 01/08/24  0616 01/08/24  0448   HS TNI 0HR ng/L  --  5   HS TNI 2HR ng/L 6  --    HSTNI D2 ng/L 1  --      Results from last 7 days   Lab Units 01/08/24  0448   LIPASE u/L 143*     Results from last 7 days   Lab Units 01/08/24  1333   AMPH/METH  Negative   BARBITURATE UR  Negative   BENZODIAZEPINE UR  Negative   COCAINE UR  Negative   METHADONE URINE  Negative   OPIATE UR  Negative   PCP UR  Negative   THC UR  Negative     ED Treatment:   Medication Administration from 01/08/2024 0355 to 01/09/2024 0818         Date/Time Order Dose Route Action     01/08/2024 0530 EST LORazepam (ATIVAN) injection 1 mg 1 mg Intravenous Given     01/08/2024 0557 EST iohexol (OMNIPAQUE) 350 MG/ML injection (MULTI-DOSE) 100 mL 100 mL Intravenous Given     01/08/2024 0645 EST aluminum-magnesium hydroxide-simethicone (MAALOX) oral suspension 30 mL 30 mL Oral Given     01/08/2024 1339 EST diazepam (VALIUM) tablet 10 mg 10 mg Oral Given     01/08/2024 1339 EST nicotine (NICODERM CQ) 21 mg/24 hr TD 24 hr patch 21 mg 21 mg Transdermal Medication Applied     01/08/2024 1853 EST carvedilol (COREG) tablet 3.125 mg 3.125 mg Oral Given     01/08/2024 1852 EST lactulose (CHRONULAC) oral solution 30 g 30 g Oral Given     01/08/2024 1853 EST rifaximin (XIFAXAN) tablet 550 mg 550 mg Oral Given          Past Medical History:   Diagnosis Date    Liver failure (HCC)      Present on Admission:  **None**      Admitting Diagnosis: Alcohol abuse [F10.10]  Age/Sex: 43 y.o. male  Admission Orders:  Medications 01/08    aluminum-magnesium hydroxide-simethicone (MAALOX) oral suspension 30 mL  Dose: 30 mL  Freq: Once Route: PO  Start: 01/08/24 0645 End: 01/08/24 0645   Admin Instructions:       0645        buPROPion (WELLBUTRIN XL) 24 hr tablet 150 mg  Dose: 150 mg  Freq: Daily Route: PO  Start: 01/09/24 0900 End: 01/09/24 0046   Admin Instructions:          carvedilol (COREG) tablet 3.125 mg  Dose: 3.125 mg  Freq: 2 times daily with meals Route: PO  Start: 01/08/24 1700 End: 01/09/24 0046   Admin Instructions:      Order specific questions:       1853        ciprofloxacin (CIPRO) tablet 500 mg  Dose: 500 mg  Freq: Every 12 hours scheduled Route: PO  Start: 01/08/24 2100 End: 01/09/24 0046   Admin Instructions:      Order specific questions:       (2042) [C]        diazepam (VALIUM) tablet 10 mg  Dose: 10 mg  Freq: Once Route: PO  Start: 01/08/24 1330 End: 01/08/24 1339   Admin Instructions:       1339        diazepam (VALIUM) tablet 5 mg  Dose: 5 mg  Freq: Once Route: PO  Start: 01/08/24 2015 End: 01/09/24 0046   Admin Instructions:       (2041) [C]        enoxaparin (LOVENOX) subcutaneous injection 40 mg  Dose: 40 mg  Freq: Daily Route: SC  Start: 01/09/24 0900 End: 01/08/24 1658   Admin Instructions:       1658-D/C'd      folic acid (FOLVITE) tablet 1 mg  Dose: 1 mg  Freq: Daily Route: PO  Start: 01/09/24 0900 End: 01/09/24 0046       furosemide (LASIX) tablet 40 mg  Dose: 40 mg  Freq: Daily Route: PO  Start: 01/09/24 0900 End: 01/09/24 0046   Admin Instructions:      Order specific questions:          lactulose (CHRONULAC) oral solution 30 g  Dose: 30 g  Freq: 2 times daily Route: PO  Start: 01/08/24 1800 End: 01/09/24 0046    1852        levothyroxine tablet 112 mcg  Dose: 112 mcg  Freq: Daily Route: PO  Start: 01/09/24 0900 End: 01/09/24 0046   Admin Instructions:          LORazepam (ATIVAN) injection 1 mg  Dose: 1 mg  Freq: Once Route: IV  Start: 01/08/24 0530 End: 01/08/24 0530   Admin Instructions:       0530         multivitamin-minerals (CENTRUM) tablet 1 tablet  Dose: 1 tablet  Freq: Daily Route: PO  Start: 01/09/24 0900 End: 01/09/24 0046   Admin Instructions:          nicotine (NICODERM CQ) 21 mg/24 hr TD 24 hr patch 21 mg  Dose: 21 mg  Freq: Once Route: TD  Start: 01/08/24 1345 End: 01/09/24 0046   Admin Instructions:       1339     2045 [C]        pantoprazole (PROTONIX) EC tablet 40 mg  Dose: 40 mg  Freq: Daily (early morning) Route: PO  Start: 01/09/24 0600 End: 01/09/24 0046   Admin Instructions:          rifaximin (XIFAXAN) tablet 550 mg  Dose: 550 mg  Freq: Every 8 hours scheduled Route: PO  Start: 01/08/24 1700 End: 01/09/24 0046   Admin Instructions:      Order specific questions:       1853        spironolactone (ALDACTONE) tablet 100 mg  Dose: 100 mg  Freq: Daily Route: PO  Start: 01/09/24 0900 End: 01/09/24 0046   Admin Instructions:      Order specific questions:          thiamine tablet 100 mg  Dose: 100 mg  Freq: Daily Route: PO  Start: 01/09/24 0900 End: 01/09/24 0046       Legend:       [id number]/0101/0201/0301/0401/0501/0601/0701/08        Continuous Meds: none    PRN Meds Sorted by Name  for Willy Evans as of 01/09/24 0823  Legend:         Medications 01/08   acetaminophen (TYLENOL) tablet 650 mg  Dose: 650 mg  Freq: Every 6 hours PRN Route: PO  PRN Reason: mild pain  Indications of Use: FEVER,HEADACHE,MILD PAIN  Start: 01/08/24 1647 End: 01/09/24 0046       aluminum-magnesium hydroxide-simethicone (MAALOX) oral suspension 30 mL  Dose: 30 mL  Freq: Every 6 hours PRN Route: PO  PRN Reasons: indigestion,heartburn  Start: 01/08/24 1647 End: 01/09/24 0046   Admin Instructions:          iohexol (OMNIPAQUE) 350 MG/ML injection (MULTI-DOSE) 100 mL  Dose: 100 mL  Freq: Once in imaging Route: IV  PRN Reason: contrast  Start: 01/08/24 0556 End: 01/08/24 0557    0557        ondansetron (ZOFRAN) injection 4 mg  Dose: 4 mg  Freq: Every 6 hours PRN Route: IV  PRN Reasons: nausea,vomiting  Start:  01/08/24 1647 End: 01/09/24 0046   Admin Instructions:            [facility]    Network Utilization Review Department  ATTENTION: Please call with any questions or concerns to [phone] and carefully listen to the prompts so that you are directed to the right person. All voicemails are confidential.   For Discharge needs, contact Care Management DC Support Team at [phone] opt. 2  Send all requests for admission clinical reviews, approved or denied determinations and any other requests to dedicated fax number below belonging to the [place] where the patient is receiving treatment. List of dedicated fax numbers for the Facilities:  FACILITY NAME UR FAX NUMBER   ADMISSION DENIALS (Administrative/Medical Necessity) [phone]   DISCHARGE SUPPORT TEAM (NETWORK) [phone]   PARENT CHILD HEALTH (Maternity/NICU/Pediatrics) [phone]   [facility] [phone]   [facility] [phone]   [facility] [phone]   [facility] [phone]   [facility] [phone]   [facility] [phone]   [facility] [phone]   [facility] [phone]   [facility] [phone]   [facility] [phone]   [facility] [phone]

## 2024-01-09 NOTE — DISCHARGE SUMMARY
[facility]  Discharge- Willy Evans 1980, 43 y.o. male MRN: [id number]  Unit/Bed#: SH 02 Encounter: [id number]  Primary Care Provider: No primary care provider on file.   Date and time admitted to hospital: 1/8/2024  4:19 AM    Left upper quadrant abdominal pain  Assessment & Plan  Patient is a 43-year-old male with PMH of alcohol abuse, alcoholic liver cirrhosis with ascites, anemia, anxiety, hypothyroidism, presented to ED with complaint of left-sided abdominal pain.  Denies any melena, hematochezia, hematemesis.  Patient found to be alcohol intoxication  Labs reveals mildly elevated lipase.  CT abdomen and pelvis is negative for acute pancreatitis, consistent with the liver cirrhosis with small ascites.    * Alcohol withdrawal (HCC)  Assessment & Plan  Patient is a 43-year-old male with PMH of alcohol abuse, alcoholic liver cirrhosis with ascites, anemia, anxiety, hypothyroidism, presented to ED with complaint of left-sided abdominal pain.  Denies any melena, hematochezia, hematemesis.  Patient reports of drinking a handle of tequila per day.  In ER patient noted to be under alcohol intoxication initially however currently undergoing withdrawal.  Patient also reported suicidal ideation on arrival therefore placed in secure holding area.  Initial plan was to transfer patient to [facility] however patient is now declining.  Continue thiamine/folic acid/MVI.  CIWA protocol    Patient requesting to leave AMA. Form signed    Suicidal ideation  Assessment & Plan  Upon arrival to ED, patient reported suicidal ideation while intoxicated and significant increased anxiety.  On my exam, denies any suicidal ideation.  However presented with increased anxiety.    Again on re-evaluation, patient denies any thought of hurting self or others.   As per Crisis worker, does not need 302 at this time.     Patient requesting to leave against medical advise, and does not wish to  pursue treatment for alcohol withdrawal.    Form signed.     Alcoholic cirrhosis of liver with ascites (HCC)  Assessment & Plan  Patient with history of alcoholic liver cirrhosis, currently actively drinking.  Previously on transplant list however taken off due to continuous drinking.  CT scan reveals small ascites.  Patient reported missing multiple doses of his diuretics.  Resume home diuretics Lasix and spironolactone.  Patient also on carvedilol, lactulose, which she has been compliant with.  Patient also on Cipro and rifaximin for SBP prevention, will resume.    Elevated bilirubin  Assessment & Plan  Total bilirubin 2+, appearing uptrending from recent labs at outside facility.  CT abd shows: No calcified gallstones. No pericholecystic inflammatory change      Discharging Physician / Practitioner: Dalia Nicole MD  PCP: No primary care provider on file.  Admission Date:   Admission Orders (From admission, onward)       Ordered        01/08/24 1629  INPATIENT ADMISSION  Once                          Discharge Date: 01/08/24    Medical Problems       Resolved Problems  Date Reviewed: 1/8/2024   None            Reason for Admission: abdominal pain, alcohol intoxication, then withdrawal    Hospital Course:     Willy Evans is a 43 y.o. male patient who originally presented to the hospital on 1/8/2024 left-sided abdominal pain and alcohol intoxicated.  CT scan was negative for acute pancreatitis.  Reportedly when patient arrived, he made a statement about thoughts of harming himself.  However on my exam during admission, he denies any thought of harming himself, denies any thought of harming others.  Appears to have normal judgment, appears to have normal cognition.    Currently patient is requesting to leave AGAINST MEDICAL ADVICE.  RN discussed with crisis worker, patient does not need any 302 (involuntary psych admission) at this point as patient did not have any thoughts of self-harm throughout ED stay.  Patient  also declining transfer to [place] for detoxification.    I discussed risk of leaving against of medical advice including worsening withdrawal, worsening anxiety, hallucination, cardiac arrhythmia, seizure and as well as death.  Patient verbalizes understanding and signed form for AGAINST MEDICAL ADVICE.    The patient, initially admitted to the hospital as inpatient, was discharged earlier than expected given the following: left AMA.    Please see above list of diagnoses and related plan for additional information.     Condition at Discharge: poor     Discharge Day Visit / Exam:     Subjective:    Patient reports he feels fine.  Denies any chest pain, dyspnea.  Ambulating around the hallway without any difficulty.    Vitals: Blood Pressure: 133/76 (01/08/24 1858)  Pulse: 78 (01/08/24 1858)  Temperature: 97.6 °F (36.4 °C) (01/08/24 0517)  Temp Source: Oral (01/08/24 0517)  Respirations: 18 (01/08/24 1857)  SpO2: 98 % (01/08/24 0610)  Exam:   Physical Exam  Vitals and nursing note reviewed.   Constitutional:       General: He is not in acute distress.     Appearance: Normal appearance.   HENT:      Head: Normocephalic and atraumatic.      Right Ear: External ear normal.      Left Ear: External ear normal.      Nose: Nose normal.   Eyes:      Extraocular Movements: Extraocular movements intact.   Pulmonary:      Effort: Pulmonary effort is normal.   Musculoskeletal:      Cervical back: Normal range of motion.   Neurological:      Mental Status: He is alert. Mental status is at baseline.   Psychiatric:         Mood and Affect: Mood normal.         Speech: Speech normal.         Behavior: Behavior normal. Behavior is cooperative.         Thought Content: Thought content normal. Thought content is not paranoid or delusional. Thought content does not include homicidal or suicidal ideation. Thought content does not include homicidal or suicidal plan.         Cognition and Memory: Cognition and memory normal.          Judgment: Judgment normal.            Discharge instructions/Information to patient and family:   See after visit summary for information provided to patient and family.      Provisions for Follow-Up Care:  See after visit summary for information related to follow-up care and any pertinent home health orders.      Disposition: against medical advise          Planned Readmission: no     Discharge Statement:  I spent 45 minutes discharging the patient. This time was spent on the day of discharge. I had direct contact with the patient on the day of discharge. Greater than 50% of the total time was spent examining patient, answering all patient questions, arranging and discussing plan of care with patient as well as directly providing post-discharge instructions.  Additional time then spent on discharge activities.    Discharge Medications:  See after visit summary for reconciled discharge medications provided to patient and family.      ** Please Note: This note has been constructed using a voice recognition system **

## 2024-01-09 NOTE — ASSESSMENT & PLAN NOTE
Upon arrival to ED, patient reported suicidal ideation while intoxicated and significant increased anxiety.  On my exam, denies any suicidal ideation.  However presented with increased anxiety.    Again on re-evaluation, patient denies any thought of hurting self or others.   As per Crisis worker, does not need 302 at this time.     Patient requesting to leave against medical advise, and does not wish to pursue treatment for alcohol withdrawal.    Form signed.

## 2024-01-09 NOTE — ASSESSMENT & PLAN NOTE
Patient is a 43-year-old male with PMH of alcohol abuse, alcoholic liver cirrhosis with ascites, anemia, anxiety, hypothyroidism, presented to ED with complaint of left-sided abdominal pain.  Denies any melena, hematochezia, hematemesis.  Patient reports of drinking a handle of tequila per day.  In ER patient noted to be under alcohol intoxication initially however currently undergoing withdrawal.  Patient also reported suicidal ideation on arrival therefore placed in secure holding area.  Initial plan was to transfer patient to [facility] detox however patient is now declining.  Continue thiamine/folic acid/MVI.  CIWA protocol    Patient requesting to leave AMA. Form signed

## 2024-01-09 NOTE — UTILIZATION REVIEW
"NOTIFICATION OF INPATIENT ADMISSION   AUTHORIZATION REQUEST   SERVICING FACILITY:   [facility]  Address: [address] [place] [address]  Tax ID: [id number]  NPI: [id number] ATTENDING PROVIDER:  Attending Name and NPI#: Dalia Nicole Md [[id number]]  Address: [address] [place] [address]  Phone: [phone]   ADMISSION INFORMATION:  Place of Service: Inpatient [facility]  Place of Service Code: 21  Inpatient Admission Date/Time: 1/8/24  4:29 PM  Discharge Date/Time: 1/8/2024  8:45 PM  Admitting Diagnosis Code/Description:  Alcohol abuse [F10.10]     UTILIZATION REVIEW CONTACT:  Natasha Ruiz"Nidhi\"Vinayak Utilization   Network Utilization Review Department  Phone: [phone]  Fax: [phone]  Email: [email or web address]  Contact for approvals/pending authorizations, clinical reviews, and discharge.     PHYSICIAN ADVISORY SERVICES:  Medical Necessity Denial & [id number]-zg-[id number] Review  Phone: [phone]  Fax: [phone]  Email: [email or web address]     DISCHARGE SUPPORT TEAM:  For Patients Discharge Needs & Updates  Phone: [phone] opt. 2 Fax: [phone]  Email: [email or web address]     "

## 2024-01-09 NOTE — ED NOTES
Spoke with Veronica from \[facility]\"", regarding patient's assessment. Veronica confirms that patient's assessment was completed, and that he had not mentioned any suicidal ideations. They were going to complete a bed search, but patient and his wife were notified that his [place] would pose as a barrier to placement. Patient denied any suicidal ideations throughout ED stay and there is not further indication that inpatient mental health treatment is needed and there are no grounds for a 302.    JUSTIN Frankel  01/08/24     [id number]